# Patient Record
Sex: MALE | Race: BLACK OR AFRICAN AMERICAN | NOT HISPANIC OR LATINO | Employment: PART TIME | URBAN - METROPOLITAN AREA
[De-identification: names, ages, dates, MRNs, and addresses within clinical notes are randomized per-mention and may not be internally consistent; named-entity substitution may affect disease eponyms.]

---

## 2024-07-25 ENCOUNTER — HOSPITAL ENCOUNTER (EMERGENCY)
Facility: HOSPITAL | Age: 54
Discharge: HOME | End: 2024-07-25
Attending: STUDENT IN AN ORGANIZED HEALTH CARE EDUCATION/TRAINING PROGRAM
Payer: MEDICAID

## 2024-07-25 VITALS
HEIGHT: 71 IN | TEMPERATURE: 98.2 F | WEIGHT: 275 LBS | RESPIRATION RATE: 20 BRPM | HEART RATE: 70 BPM | SYSTOLIC BLOOD PRESSURE: 130 MMHG | DIASTOLIC BLOOD PRESSURE: 73 MMHG | OXYGEN SATURATION: 96 % | BODY MASS INDEX: 38.5 KG/M2

## 2024-07-25 DIAGNOSIS — H93.13 BILATERAL TINNITUS: Primary | ICD-10-CM

## 2024-07-25 PROCEDURE — 99283 EMERGENCY DEPT VISIT LOW MDM: CPT

## 2024-07-25 PROCEDURE — 99284 EMERGENCY DEPT VISIT MOD MDM: CPT | Performed by: STUDENT IN AN ORGANIZED HEALTH CARE EDUCATION/TRAINING PROGRAM

## 2024-07-25 PROCEDURE — 2500000004 HC RX 250 GENERAL PHARMACY W/ HCPCS (ALT 636 FOR OP/ED): Performed by: STUDENT IN AN ORGANIZED HEALTH CARE EDUCATION/TRAINING PROGRAM

## 2024-07-25 RX ORDER — PREDNISONE 10 MG/1
TABLET ORAL
Qty: 35 TABLET | Refills: 0 | Status: SHIPPED | OUTPATIENT
Start: 2024-07-25 | End: 2024-08-03

## 2024-07-25 RX ADMIN — PREDNISONE 50 MG: 20 TABLET ORAL at 19:37

## 2024-07-25 ASSESSMENT — COLUMBIA-SUICIDE SEVERITY RATING SCALE - C-SSRS
2. HAVE YOU ACTUALLY HAD ANY THOUGHTS OF KILLING YOURSELF?: NO
6. HAVE YOU EVER DONE ANYTHING, STARTED TO DO ANYTHING, OR PREPARED TO DO ANYTHING TO END YOUR LIFE?: NO
1. IN THE PAST MONTH, HAVE YOU WISHED YOU WERE DEAD OR WISHED YOU COULD GO TO SLEEP AND NOT WAKE UP?: NO

## 2024-07-25 ASSESSMENT — PAIN DESCRIPTION - PAIN TYPE: TYPE: ACUTE PAIN

## 2024-07-25 ASSESSMENT — LIFESTYLE VARIABLES
EVER HAD A DRINK FIRST THING IN THE MORNING TO STEADY YOUR NERVES TO GET RID OF A HANGOVER: NO
HAVE YOU EVER FELT YOU SHOULD CUT DOWN ON YOUR DRINKING: NO
HAVE PEOPLE ANNOYED YOU BY CRITICIZING YOUR DRINKING: NO
EVER FELT BAD OR GUILTY ABOUT YOUR DRINKING: NO
TOTAL SCORE: 0

## 2024-07-25 ASSESSMENT — PAIN DESCRIPTION - LOCATION: LOCATION: EAR

## 2024-07-25 ASSESSMENT — PAIN - FUNCTIONAL ASSESSMENT: PAIN_FUNCTIONAL_ASSESSMENT: 0-10

## 2024-07-25 ASSESSMENT — PAIN SCALES - GENERAL: PAINLEVEL_OUTOF10: 8

## 2024-07-25 NOTE — ED TRIAGE NOTES
Pt to ED with c/o bilateral ear pain and ringing. Pt states he's been in a rehab facility for alcohol for 67 days, woke up today with the symptoms. No recent exposure to load noises, no recent injury or trauma to the ear/head. 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     No medical conditions noted in triage.

## 2024-07-25 NOTE — ED PROVIDER NOTES
HPI: The patient is a 53-year-old man with history of alcohol use disorder who is currently in rehab as well as who is a  who presents to the Emergency Department with a chief complaint of bilateral ringing in his ears.  Patient reports that he has a history of longstanding tinnitus but reports it has been unchanged for many years until he woke up this morning and it was significantly worse in both ears.  He reports that hearing is otherwise normal and he denies any other symptoms such as headache, numbness tingling weakness or dizziness.  Denies any visual disturbances.  He reports that he is currently on prazosin, melatonin as well as sertraline which is all been started at his facility where he is getting alcohol rehab.  Denies any other alcohol use recently.     PAST MEDICAL HISTORY:  as per HPI  ALLERGIES:  as per HPI  MEDICATIONS:  as per HPI  FAMILY HISTORY: as per HPI  SURGICAL HISTORY: as per HPI  SOCIAL HISTORY: as per HPI     PHYSICAL EXAM:  VITAL SIGNS: Nursing notes reviewed.  GENERAL:  Alert and interactive  EYES:   Eyes track.  ENT:  Airway patent.  No effusion behind the tympanic membrane.  No bulging or opacification or erythema to the TM.  External auditory canals normal-appearing.  Patient reports hearing is equal bilaterally and when he hums, sounds equal bilaterally.  RESPIRATORY:  Nonlabored breathing.  CARDIOVASCULAR:  [Regular rate.] [Regular rhythm.]  GASTROINTESTINAL:  No distension.  MUSCULOSKELETAL:  No deformity.   NEUROLOGICAL:  Awake.  Tracks with eyes, PERRL, EOMI, equal sensation in V1-V3, no facial droop, sounds equal in both ears, 5/5 w/ strength shoulder raise, tongue protrudes at midline, soft palate raises symmetrically 5/5 strength in UE and LE, no deficit with light touch, normal finger to nose and heel to shin, visual fields intact in all 4 quadrants of both eyes, normal gait.  NIH 0  SKIN:  Dry.        MEDICAL DECISION MAKING (MDM):        SUMMARY:  The patient is  admitted to the Emergency Department for evaluation of above. Complete history and physical examination was performed by me.  Patient presents with bilateral tenderness.  This does not seem to be due to ototoxicity from a medication based on the meds he is on.  Patient does deny aspirin use.  Patient is neurologically intact so low suspicion for stroke or brainstem lesion.  No evidence of otitis media or a effusion of the middle ear.  I am worried about bilateral sensorineural hearing loss I did reach out to ENT and discussed the case with him.  They did report that the patient would benefit from initiating steroids and giving recommendations on the dose and duration.  They recommended 50 mg daily for 5 days followed by a 5-day taper.  They also recommended scheduling urgent outpatient audiogram with a follow-up with him right after the audiogram was completed.  We had our schedulers work on this and I gave the patient his first dose of prednisone here as well as a prescription to go home with.  I did discuss return precautions at length with him.  The work-up and plan were discussed with the patient/caregiver and questions were answered regarding the ED visit.  Educational materials were provided as well as return precautions including returning for any persisting or worsening symptoms.  Patient was recommended to follow-up with PCP in the next few days in addition to any potential specialists that were discussed.  The patient/family expressed understanding and agreed to the described plan.  Patient was discharged in stable condition.     DIAGNOSIS:    Bilateral tinnitus     DISPOSITION:    1) discharged  [2) Please see Exit Care and discharge instructions for complete details.]     Solomon Hall MD  07/25/24 1959

## 2024-08-08 ENCOUNTER — APPOINTMENT (OUTPATIENT)
Dept: OTOLARYNGOLOGY | Facility: CLINIC | Age: 54
End: 2024-08-08
Payer: MEDICAID

## 2024-08-08 ENCOUNTER — CLINICAL SUPPORT (OUTPATIENT)
Dept: AUDIOLOGY | Facility: CLINIC | Age: 54
End: 2024-08-08
Payer: MEDICAID

## 2024-08-08 DIAGNOSIS — H90.3 ASYMMETRIC SNHL (SENSORINEURAL HEARING LOSS): Primary | ICD-10-CM

## 2024-08-08 DIAGNOSIS — H93.13 TINNITUS OF BOTH EARS: ICD-10-CM

## 2024-08-08 PROCEDURE — 92550 TYMPANOMETRY & REFLEX THRESH: CPT

## 2024-08-08 PROCEDURE — 92557 COMPREHENSIVE HEARING TEST: CPT

## 2024-08-08 NOTE — PROGRESS NOTES
"ADULT AUDIOLOGY EVALUATION    Name:  Paco Bhatt  :  1970  Age:  53 y.o.  Date of Evaluation:  2024    IMPRESSIONS     Today's test results indicate normal middle ear functioning with normal hearing sensitivity sloping to moderately-severe sensorineural hearing loss in the right ear and normal hearing sensitivity sloping to mild likely sensorineural hearing loss in the left ear. Asymmetries exist 7186-5069 Hz with the right ear being worse.     At this time Paco is a borderline hearing aid candidate in the right ear (as he has normal hearing through 2000 Hz). He does not perceive great hearing difficulty.  He will see ENT today for medical evaluation. Annual hearing tests are recommended in order to monitor.     RECOMMENDATIONS     Continue medical follow up with PCP/ENT.  Return for annual hearing evaluations due to asymmetric sensorineural hearing loss.   Tinnitus coping techniques/strategies were discussed (i.e., keeping himself in sound - using tv, radio, fans, soothing sounds, music, etc.) to keep the brain busy and less focused on the tinnitus.    Time: 11:13-11:40    HISTORY     Paco Bhatt, 53 year old male, was seen today for an audiologic evaluation prior to ENT appointment with Fany Castellano CNP. Paco reported that he has been experiencing a \"noise\" (tinnitus) in his ears that is high-pitched and constant in nature. He noted that this has been going on for a few years but he is starting to find it to be bothersome. 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 He did reportedly use hearing protection. He denied significant hearing concerns, dizziness, aural fullness, recent ear infections, otalgia, otorrhea or history of otologic surgeries.    EVALUATION         TEST RESULTS     Otoscopic Evaluation:  Right Ear: Clear ear canal with unremarkable tympanic membrane  Left Ear: Clear ear canal with unremarkable tympanic " membrane    Tympanometry:   Right Ear: Normal, type A tympanogram with normal ear canal volume, peak pressure and compliance.   Left Ear: Normal, type A tympanogram with normal ear canal volume, peak pressure and compliance.     Ipsilateral Acoustic Reflexes:   Right Ear: Present 500-2000 Hz, absent 4000 Hz.   Left Ear: Present 500-2000 Hz, absent 4000 Hz.     Pure Tone Audiometry (125-8000 Hz):   Right Ear: Normal hearing sensitivity 125-2000 Hz sloping to moderately-severe sensorineural hearing loss through 8000 Hz.   Left Ear: Normal hearing sensitivity 125-4000 Hz sloping to mild likely sensorineural hearing loss through 8000 Hz.   *Asymmetries noted 9755-9193 Hz, right ear being worse.     Speech Audiometry:   Right Ear:    Speech Reception Threshold (SRT) was obtained at 25 dBHL.   Word Recognition scores were excellent in quiet when words were presented at 65 dBHL with 45 dBHL of masking using NU-6 word ordered by difficulty.  Left Ear:    Speech Reception Threshold (SRT) was obtained at 10 dBHL.   Word Recognition scores were excellent in quiet when words were presented at 50 dBHL using NU-6 word ordered by difficulty.     Distortion Product Otoacoustic Emissions:  Right Ear: Present at 4230-8595, absent at 6492-7416 Hz.   Left Ear: Present at 1285-6753, absent at 3774-0355 Hz.         TANI Willingham, CCC-A  Licensed Clinical Audiologist    Degree of Hearing Sensitivity Decibel Range   Within Normal Limits (WNL) 0-25   Mild 26-40   Moderate 41-55   Moderately-Severe 56-70   Severe 71-90   Profound 91+      Key   CNT/DNT Could Not Test/Did Not Test   TM Tympanic Membrane   WNL Within Normal Limits   HA Hearing Aid   SNHL Sensorineural Hearing Loss   CHL Conductive Hearing Loss   NIHL Noise-Induced Hearing Loss   ECV Ear Canal Volume   MLV Monitored Live Voice

## 2024-08-12 ENCOUNTER — APPOINTMENT (OUTPATIENT)
Dept: OTOLARYNGOLOGY | Facility: CLINIC | Age: 54
End: 2024-08-12
Payer: MEDICAID

## 2024-08-12 VITALS — BODY MASS INDEX: 40.22 KG/M2 | HEIGHT: 71 IN | WEIGHT: 287.3 LBS | TEMPERATURE: 96.8 F

## 2024-08-12 DIAGNOSIS — H91.8X3 ASYMMETRICAL HEARING LOSS: Primary | ICD-10-CM

## 2024-08-12 DIAGNOSIS — H90.3 SENSORINEURAL HEARING LOSS (SNHL) OF BOTH EARS: ICD-10-CM

## 2024-08-12 DIAGNOSIS — H93.13 TINNITUS OF BOTH EARS: ICD-10-CM

## 2024-08-12 PROCEDURE — 1036F TOBACCO NON-USER: CPT | Performed by: NURSE PRACTITIONER

## 2024-08-12 PROCEDURE — 99204 OFFICE O/P NEW MOD 45 MIN: CPT | Performed by: NURSE PRACTITIONER

## 2024-08-12 PROCEDURE — 3008F BODY MASS INDEX DOCD: CPT | Performed by: NURSE PRACTITIONER

## 2024-08-12 RX ORDER — SERTRALINE HYDROCHLORIDE 50 MG/1
50 TABLET, FILM COATED ORAL DAILY
COMMUNITY

## 2024-08-12 RX ORDER — PREDNISONE 10 MG/1
TABLET ORAL
COMMUNITY

## 2024-08-12 RX ORDER — PRAZOSIN HYDROCHLORIDE 1 MG/1
1 CAPSULE ORAL NIGHTLY
COMMUNITY

## 2024-08-12 RX ORDER — BENZONATATE 100 MG/1
CAPSULE ORAL
COMMUNITY
Start: 2024-02-19

## 2024-08-12 RX ORDER — DOXEPIN HYDROCHLORIDE 25 MG/1
25 CAPSULE ORAL NIGHTLY PRN
COMMUNITY
Start: 2024-05-21

## 2024-08-12 ASSESSMENT — PATIENT HEALTH QUESTIONNAIRE - PHQ9
1. LITTLE INTEREST OR PLEASURE IN DOING THINGS: NOT AT ALL
2. FEELING DOWN, DEPRESSED OR HOPELESS: NOT AT ALL
SUM OF ALL RESPONSES TO PHQ9 QUESTIONS 1 AND 2: 0

## 2024-08-12 ASSESSMENT — PAIN SCALES - GENERAL: PAINLEVEL: 0-NO PAIN

## 2024-08-12 NOTE — PROGRESS NOTES
Subjective   Patient ID: Paco Bhatt is a 53 y.o. male who presents for Tinnitus.  HPI  This patient is referred for evaluation of  non-pulsatile bilateral tinnitus.  The patient is not accompanied by a family member.   When asked about ear pain, hearing loss, discharge from ear, tinnitus, aural fullness or autophony, the patient admits to bilateral tinnitus and occasional vertigo.  When asked whether the tinnitus interfered with the patient´s daily activities or prevented the patient from falling asleep, the patient reported it is quite bothersome.  He denies any history of bruxism or neck pain/stiffness.  When asked about a significant past otological history including history of prior ear surgery, noise exposure, exposure to ototoxic drugs or agents, and/or family history of hearing loss, the patient admits to noise exposure in the .  Review of Systems  A comprehensive or 10 points review of the patient´s constitutional, neurological, HEENT, pulmonary, cardiovascular and genito-urinary systems showed only those mentioned in history of present illness.    Objective   Physical Exam  Constitutional: no fever, chills, weight loss or weight gain   General appearance: Appears well, well-nourished, well groomed. No acute distress.   Communication: Normal communication   Psychiatric: Oriented to person, place and time. Normal mood and affect.   Neurologic: Cranial nerves II-XII grossly intact and symmetric bilaterally.   Head and Face:   Head: Atraumatic with no masses, lesions or scarring.   Face: Normal symmetry, no paralysis, synkinesis or facial tic. No scars or deformities.   TMJ: Normal, no trismus.   Eyes: Conjunctiva not edematous or erythematous   Ears: External inspection of ears with no deformity, scars or masses. Bilateral EACs clear and bilateral TMs intact with no signs of effusions   Nose: External inspection of nose: No nasal lesions, lacerations or scars.   Neck: Normal appearing, symmetric,  trachea midline.   Cardiovascular: Examination of peripheral vascular system shows no clubbing or cyanosis.   Respiratory: No respiratory distress increased work of breathing. Inspection of the chest with symmetric chest expansion and normal respiratory effort.   Skin: No rashes in the head or neck  Bedside occulomotor function assessment for ocular pursuits and saccades, spontaneous nystagmus,  positional and postural testing (Romberg, Fukuta, bilateral head thrust) is normal.  My interpretation of the audiogram done 8/8/2024 is right-sided with normal hearing through 2000 Hz sloping to moderate sensorineural hearing loss with excellent word recognition score and normal tympanogram.  Left side with normal hearing through 4000 Hz sloping to mild sensorineural hearing loss with excellent word recognition score and normal tympanogram.  Assessment/Plan        This patient presents for initial evaluation of chronic acquired bilateral/asymmetrical sensorineural hearing loss and bilateral subjective tinnitus.    Reassurance given that otologic exam and balance testing today are both normal.  Audiogram was reviewed in detail.  We discussed possible etiologies of asymmetry including viral inflammation, vascular insufficiency, noise exposure, acoustic neuroma.  I recommended MRI IAC with and without contrast to further evaluate.  I am happy to discuss results over the phone.  Patient is not interested in hearing amplification at this time.  The likely etiology of the tinnitus was reviewed. The various masking and distraction techniques were discussed. All questions were answered to the patient´s satisfaction.    This note was created using speech recognition transcription software. Despite proofreading, several typographical errors might be present that might affect the meaning of the content. Please call with any questions.      ELIZABETH Nicolas-CATINA 08/12/24 3:48 PM

## 2024-08-30 ENCOUNTER — HOSPITAL ENCOUNTER (OUTPATIENT)
Dept: RADIOLOGY | Facility: HOSPITAL | Age: 54
Discharge: HOME | End: 2024-08-30
Payer: MEDICAID

## 2024-08-30 DIAGNOSIS — H91.8X3 ASYMMETRICAL HEARING LOSS: ICD-10-CM

## 2024-08-30 PROCEDURE — A9575 INJ GADOTERATE MEGLUMI 0.1ML: HCPCS | Mod: SE | Performed by: NURSE PRACTITIONER

## 2024-08-30 PROCEDURE — 2550000001 HC RX 255 CONTRASTS: Mod: SE | Performed by: NURSE PRACTITIONER

## 2024-08-30 PROCEDURE — 70553 MRI BRAIN STEM W/O & W/DYE: CPT

## 2024-08-30 RX ORDER — GADOTERATE MEGLUMINE 376.9 MG/ML
25 INJECTION INTRAVENOUS
Status: COMPLETED | OUTPATIENT
Start: 2024-08-30 | End: 2024-08-30